# Patient Record
Sex: MALE | NOT HISPANIC OR LATINO | URBAN - METROPOLITAN AREA
[De-identification: names, ages, dates, MRNs, and addresses within clinical notes are randomized per-mention and may not be internally consistent; named-entity substitution may affect disease eponyms.]

---

## 2018-06-13 ENCOUNTER — OFFICE VISIT (OUTPATIENT)
Dept: URBAN - METROPOLITAN AREA CLINIC 33 | Facility: CLINIC | Age: 23
End: 2018-06-13
Payer: COMMERCIAL

## 2018-06-13 PROCEDURE — 92014 COMPRE OPH EXAM EST PT 1/>: CPT | Performed by: OPTOMETRIST

## 2018-06-13 PROCEDURE — 92015 DETERMINE REFRACTIVE STATE: CPT | Performed by: OPTOMETRIST

## 2018-06-13 ASSESSMENT — VISUAL ACUITY
OD: 20/20
OS: 20/20

## 2018-06-13 ASSESSMENT — INTRAOCULAR PRESSURE
OD: 12
OS: 13

## 2018-06-13 ASSESSMENT — KERATOMETRY
OS: 43.38
OD: 42.63

## 2018-06-13 NOTE — IMPRESSION/PLAN
Impression: Myopia, bilateral: H52.13. Plan: Dispensed trial contacts lenses. Educated patient about today's findings. Order refraction to determine patient's best corrected visual acuity as it relates to myopia- dispensed Rx to patient. Patient was educated about 1-2 week adaptation period with new Rx.

## 2019-06-27 ENCOUNTER — OFFICE VISIT (OUTPATIENT)
Dept: URBAN - METROPOLITAN AREA CLINIC 33 | Facility: CLINIC | Age: 24
End: 2019-06-27
Payer: COMMERCIAL

## 2019-06-27 DIAGNOSIS — H52.223 REGULAR ASTIGMATISM, BILATERAL: Primary | ICD-10-CM

## 2019-06-27 PROCEDURE — 92014 COMPRE OPH EXAM EST PT 1/>: CPT | Performed by: OPTOMETRIST

## 2019-06-27 ASSESSMENT — KERATOMETRY
OS: 43.38
OD: 42.75

## 2019-06-27 ASSESSMENT — VISUAL ACUITY
OD: 20/20
OS: 20/20

## 2019-06-27 ASSESSMENT — INTRAOCULAR PRESSURE
OD: 12
OS: 12

## 2019-06-27 NOTE — IMPRESSION/PLAN
Impression: Regular astigmatism, bilateral: H52.223. OU. Plan: Educated patient about today's findings. Order refraction to determine patient's best corrected visual acuity as it relates to astigmatism- dispensed Rx to patient. Patient was educated about 1-2 week adaptation period with new Rx.

## 2020-07-31 ENCOUNTER — OFFICE VISIT (OUTPATIENT)
Dept: URBAN - METROPOLITAN AREA CLINIC 33 | Facility: CLINIC | Age: 25
End: 2020-07-31
Payer: COMMERCIAL

## 2020-07-31 DIAGNOSIS — H52.13 MYOPIA, BILATERAL: Primary | ICD-10-CM

## 2020-07-31 PROCEDURE — 92014 COMPRE OPH EXAM EST PT 1/>: CPT | Performed by: OPTOMETRIST

## 2020-07-31 ASSESSMENT — INTRAOCULAR PRESSURE
OS: 12
OD: 14

## 2020-07-31 ASSESSMENT — VISUAL ACUITY
OS: 20/20
OD: 20/20

## 2022-04-15 ENCOUNTER — OFFICE VISIT (OUTPATIENT)
Dept: URBAN - METROPOLITAN AREA CLINIC 33 | Facility: CLINIC | Age: 27
End: 2022-04-15
Payer: COMMERCIAL

## 2022-04-15 DIAGNOSIS — H52.13 MYOPIA, BILATERAL: Primary | ICD-10-CM

## 2022-04-15 PROCEDURE — 92012 INTRM OPH EXAM EST PATIENT: CPT | Performed by: OPTOMETRIST

## 2022-04-15 NOTE — IMPRESSION/PLAN
Impression: Myopia, bilateral: H52.13. Plan: Educated patient about today's findings. Order refraction to determine patient's best corrected visual acuity as it relates to myopia- dispensed Rx to patient. Patient was educated about 1-2 week adaptation period with new Rx. No

## 2023-06-19 ENCOUNTER — OFFICE VISIT (OUTPATIENT)
Dept: URBAN - METROPOLITAN AREA CLINIC 33 | Facility: CLINIC | Age: 28
End: 2023-06-19
Payer: COMMERCIAL

## 2023-06-19 DIAGNOSIS — H52.13 MYOPIA, BILATERAL: Primary | ICD-10-CM

## 2023-06-19 PROCEDURE — 92012 INTRM OPH EXAM EST PATIENT: CPT | Performed by: OPTOMETRIST

## 2023-06-19 ASSESSMENT — VISUAL ACUITY
OD: 20/20
OS: 20/20

## 2023-06-19 ASSESSMENT — INTRAOCULAR PRESSURE
OS: 14
OD: 14